# Patient Record
Sex: MALE | Race: WHITE | NOT HISPANIC OR LATINO | Employment: OTHER | URBAN - NONMETROPOLITAN AREA
[De-identification: names, ages, dates, MRNs, and addresses within clinical notes are randomized per-mention and may not be internally consistent; named-entity substitution may affect disease eponyms.]

---

## 2018-03-29 PROBLEM — R09.81 CHRONIC NASAL CONGESTION: Status: ACTIVE | Noted: 2018-03-29

## 2018-03-29 PROBLEM — G47.34 NOCTURNAL HYPOXEMIA: Status: ACTIVE | Noted: 2018-03-29

## 2018-08-27 PROBLEM — H61.23 CERUMEN DEBRIS ON TYMPANIC MEMBRANE, BILATERAL: Status: ACTIVE | Noted: 2018-08-27

## 2018-11-07 PROBLEM — N42.89: Status: ACTIVE | Noted: 2018-11-07

## 2018-11-07 PROBLEM — Z96.0 URINARY CATHETER IN PLACE: Status: ACTIVE | Noted: 2018-11-07

## 2019-08-24 PROBLEM — R26.81 UNSTEADY GAIT: Status: ACTIVE | Noted: 2019-08-24

## 2019-08-24 PROBLEM — L03.113 CELLULITIS OF RIGHT ARM: Status: ACTIVE | Noted: 2019-08-24

## 2019-08-24 PROBLEM — E87.1 HYPONATREMIA: Status: ACTIVE | Noted: 2019-08-24

## 2019-08-24 PROBLEM — R63.4 WEIGHT LOSS: Status: ACTIVE | Noted: 2019-08-24

## 2019-08-24 PROBLEM — J02.9 SORE THROAT: Status: ACTIVE | Noted: 2019-08-24

## 2019-08-26 PROBLEM — R26.81 UNSTEADY GAIT: Status: RESOLVED | Noted: 2019-08-24 | Resolved: 2019-08-26

## 2019-08-26 PROBLEM — L03.113 CELLULITIS OF RIGHT ARM: Status: RESOLVED | Noted: 2019-08-24 | Resolved: 2019-08-26

## 2020-05-29 ENCOUNTER — OFFICE VISIT (OUTPATIENT)
Dept: URGENT CARE | Facility: CLINIC | Age: 85
End: 2020-05-29
Payer: MEDICARE

## 2020-05-29 VITALS
DIASTOLIC BLOOD PRESSURE: 60 MMHG | OXYGEN SATURATION: 96 % | HEIGHT: 70 IN | SYSTOLIC BLOOD PRESSURE: 142 MMHG | TEMPERATURE: 97.4 F | WEIGHT: 162.4 LBS | HEART RATE: 50 BPM | BODY MASS INDEX: 23.25 KG/M2

## 2020-05-29 DIAGNOSIS — T16.1XXA FOREIGN BODY OF RIGHT EAR, INITIAL ENCOUNTER: ICD-10-CM

## 2020-05-29 PROCEDURE — 69200 CLEAR OUTER EAR CANAL: CPT | Performed by: PHYSICIAN ASSISTANT

## 2020-05-29 ASSESSMENT — ENCOUNTER SYMPTOMS
SORE THROAT: 0
FEVER: 0
NUMBNESS: 0
HEADACHES: 0
VOMITING: 0
CHILLS: 0
DIZZINESS: 0
NAUSEA: 0
WEAKNESS: 0

## 2020-05-29 ASSESSMENT — FIBROSIS 4 INDEX: FIB4 SCORE: 4.33

## 2020-05-29 NOTE — PROGRESS NOTES
Subjective:      Adrian Jernigan is a 89 y.o. male who presents with Other ((R) ear x last night; hearing aid part stuck)            Foreign Body in Ear   This is a new problem. The current episode started yesterday (small part of hearing aide stuck in right ear ). The problem occurs constantly. The problem has been unchanged. Pertinent negatives include no chills, fever, headaches, nausea, numbness, rash, sore throat, vomiting or weakness. Nothing aggravates the symptoms. He has tried nothing for the symptoms.     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 Hilario pulmonology 8/15/2013   • Nocturnal hypoxemia 3/29/2018   • Other disorder of muscle, ligament, and fascia     patient denies   • Rectal fistula    • Renal disorder     chronic kidney disease   • Renal insufficiency 11/11/2015   • Snoring     awakens spouse,uses oxygen   • Unspecified hypothyroidism     low   • Unspecified urinary incontinence     cath in place   • Urinary bladder  "disorder     cath in place   • Urinary catheter in place 11/7/2018       ;  Past Surgical History:   Procedure Laterality Date   • CARDIAC CATH, LEFT HEART  04/2019    no clinically significant coronary disease, mild plaquing   • FINGER OR HAND INCISION AND DRAINAGE  6/10/2014    Performed by Leo Chang M.D. at [facility]   • THORACOSCOPY  9/24/2013    Performed by John H Ganser, M.D. at [facility]   • PROSTATECTOMY, RADICAL RETRO  2004   • CATARACT EXTRACTION WITH IOL     • CHOLECYSTECTOMY     • LAMINOTOMY     • OPEN REDUCTION      right ankle   • OTHER ABDOMINAL SURGERY      right adrenalectomy       Family History   Problem Relation Age of Onset   • Stroke Mother    • Stroke Father        Allergies   Allergen Reactions   • Cortisone      Increased B/P,and heart rate   • Morphine Vomiting   • Quinine Rash         Medications, Allergies, and current problem list reviewed today in Epic      Review of Systems   Constitutional: Negative for chills, fever and malaise/fatigue.   HENT: Negative for sore throat.         Foreign body in right ear    Gastrointestinal: Negative for nausea and vomiting.   Skin: Negative for rash.   Neurological: Negative for dizziness, weakness, numbness and headaches.     All other systems reviewed and are negative.        Objective:     /60   Pulse (!) 50   Temp 36.3 °C (97.4 °F) (Temporal)   Ht 1.778 m (5' 10\")   Wt 73.7 kg (162 lb 6.4 oz)   SpO2 96%   BMI 23.30 kg/m²      Physical Exam  Constitutional:       General: He is not in acute distress.  HENT:      Head: Normocephalic and atraumatic.      Right Ear: External ear normal. A foreign body is present.      Left Ear: External ear normal.      Ears:      Comments: Small hearing aide part in right ear   Eyes:      Conjunctiva/sclera: Conjunctivae normal.   Cardiovascular:      Rate and Rhythm: Bradycardia present.   Pulmonary:      Effort: Pulmonary effort is normal. No respiratory distress. "   Musculoskeletal: Normal range of motion.   Skin:     General: Skin is warm and dry.   Neurological:      General: No focal deficit present.      Mental Status: He is alert and oriented to person, place, and time.   Psychiatric:         Mood and Affect: Mood normal.         Behavior: Behavior normal.         Thought Content: Thought content normal.         Judgment: Judgment normal.                 Assessment/Plan:       1. Foreign body of right ear, initial encounter    Foreign body easily removed with forceps.  Ear canal without any damage after removal.    Differential diagnoses, Supportive care, and indications for immediate follow-up discussed with patient.   Pathogenesis of diagnosis discussed including typical length and natural progression.   Instructed to return to clinic or nearest emergency department for any change in condition, further concerns, or worsening of symptoms.      The patient demonstrated a good understanding and agreed with the treatment plan.    Pam Farfan P.A.-C.

## 2023-03-06 PROBLEM — I50.30 DIASTOLIC CHF (HCC): Status: ACTIVE | Noted: 2023-03-06

## 2023-03-06 PROBLEM — R74.01 ELEVATED AST (SGOT): Status: ACTIVE | Noted: 2023-03-06

## 2023-03-06 PROBLEM — I65.29 CAROTID STENOSIS: Status: ACTIVE | Noted: 2021-11-16

## 2023-03-06 PROBLEM — R09.02 HYPOXIA: Status: ACTIVE | Noted: 2023-03-06

## 2023-03-06 PROBLEM — I35.0 AORTIC STENOSIS: Status: ACTIVE | Noted: 2021-11-16

## 2023-03-06 PROBLEM — E83.51 HYPOCALCEMIA: Status: ACTIVE | Noted: 2023-03-06

## 2023-03-06 PROBLEM — Z95.0 PACEMAKER: Status: ACTIVE | Noted: 2022-03-12

## 2023-03-06 PROBLEM — I83.893 VARICOSE VEINS OF BOTH LEGS WITH EDEMA: Status: ACTIVE | Noted: 2021-11-16

## 2023-03-06 PROBLEM — D69.6 THROMBOCYTOPENIA (HCC): Status: ACTIVE | Noted: 2023-03-06

## 2025-02-19 ENCOUNTER — HOSPITAL ENCOUNTER (OUTPATIENT)
Facility: MEDICAL CENTER | Age: OVER 89
End: 2025-02-19
Attending: UROLOGY
Payer: MEDICARE

## 2025-02-19 PROCEDURE — 87086 URINE CULTURE/COLONY COUNT: CPT

## 2025-02-21 LAB
BACTERIA UR CULT: NORMAL
SIGNIFICANT IND 70042: NORMAL
SITE SITE: NORMAL
SOURCE SOURCE: NORMAL

## 2025-03-14 ENCOUNTER — HOSPITAL ENCOUNTER (OUTPATIENT)
Facility: MEDICAL CENTER | Age: OVER 89
End: 2025-03-14
Payer: MEDICARE

## 2025-03-14 PROCEDURE — 87086 URINE CULTURE/COLONY COUNT: CPT

## 2025-03-14 PROCEDURE — 87077 CULTURE AEROBIC IDENTIFY: CPT

## 2025-03-14 PROCEDURE — 87186 SC STD MICRODIL/AGAR DIL: CPT | Mod: 91

## 2025-03-16 LAB
BACTERIA UR CULT: ABNORMAL
SIGNIFICANT IND 70042: ABNORMAL
SITE SITE: ABNORMAL
SOURCE SOURCE: ABNORMAL